# Patient Record
Sex: MALE | Race: WHITE | NOT HISPANIC OR LATINO | Employment: UNEMPLOYED | URBAN - METROPOLITAN AREA
[De-identification: names, ages, dates, MRNs, and addresses within clinical notes are randomized per-mention and may not be internally consistent; named-entity substitution may affect disease eponyms.]

---

## 2024-11-15 ENCOUNTER — APPOINTMENT (OUTPATIENT)
Dept: CT IMAGING | Facility: HOSPITAL | Age: 59
End: 2024-11-15
Payer: MEDICAID

## 2024-11-15 ENCOUNTER — HOSPITAL ENCOUNTER (EMERGENCY)
Facility: HOSPITAL | Age: 59
Discharge: HOME | End: 2024-11-15
Payer: MEDICAID

## 2024-11-15 VITALS
BODY MASS INDEX: 29.73 KG/M2 | SYSTOLIC BLOOD PRESSURE: 132 MMHG | OXYGEN SATURATION: 95 % | HEIGHT: 64 IN | WEIGHT: 174.16 LBS | HEART RATE: 74 BPM | RESPIRATION RATE: 18 BRPM | DIASTOLIC BLOOD PRESSURE: 80 MMHG | TEMPERATURE: 98.3 F

## 2024-11-15 DIAGNOSIS — R11.0 NAUSEA: ICD-10-CM

## 2024-11-15 DIAGNOSIS — R42 DIZZINESS: Primary | ICD-10-CM

## 2024-11-15 LAB
ANION GAP SERPL CALCULATED.3IONS-SCNC: 13.1 MMOL/L (ref 5–15)
BASOPHILS # BLD AUTO: 0.04 10*3/MM3 (ref 0–0.2)
BASOPHILS NFR BLD AUTO: 0.7 % (ref 0–1.5)
BUN SERPL-MCNC: 15 MG/DL (ref 6–20)
BUN/CREAT SERPL: 21.7 (ref 7–25)
CALCIUM SPEC-SCNC: 9.4 MG/DL (ref 8.6–10.5)
CHLORIDE SERPL-SCNC: 102 MMOL/L (ref 98–107)
CO2 SERPL-SCNC: 25.9 MMOL/L (ref 22–29)
CREAT SERPL-MCNC: 0.69 MG/DL (ref 0.76–1.27)
DEPRECATED RDW RBC AUTO: 49.7 FL (ref 37–54)
EGFRCR SERPLBLD CKD-EPI 2021: 107.9 ML/MIN/1.73
EOSINOPHIL # BLD AUTO: 0.5 10*3/MM3 (ref 0–0.4)
EOSINOPHIL NFR BLD AUTO: 9 % (ref 0.3–6.2)
ERYTHROCYTE [DISTWIDTH] IN BLOOD BY AUTOMATED COUNT: 15.1 % (ref 12.3–15.4)
GLUCOSE SERPL-MCNC: 99 MG/DL (ref 65–99)
HCT VFR BLD AUTO: 33.5 % (ref 37.5–51)
HGB BLD-MCNC: 10.4 G/DL (ref 13–17.7)
IMM GRANULOCYTES # BLD AUTO: 0.01 10*3/MM3 (ref 0–0.05)
IMM GRANULOCYTES NFR BLD AUTO: 0.2 % (ref 0–0.5)
LYMPHOCYTES # BLD AUTO: 1.74 10*3/MM3 (ref 0.7–3.1)
LYMPHOCYTES NFR BLD AUTO: 31.5 % (ref 19.6–45.3)
MCH RBC QN AUTO: 27.5 PG (ref 26.6–33)
MCHC RBC AUTO-ENTMCNC: 31 G/DL (ref 31.5–35.7)
MCV RBC AUTO: 88.6 FL (ref 79–97)
MONOCYTES # BLD AUTO: 0.45 10*3/MM3 (ref 0.1–0.9)
MONOCYTES NFR BLD AUTO: 8.1 % (ref 5–12)
NEUTROPHILS NFR BLD AUTO: 2.79 10*3/MM3 (ref 1.7–7)
NEUTROPHILS NFR BLD AUTO: 50.5 % (ref 42.7–76)
NRBC BLD AUTO-RTO: 0 /100 WBC (ref 0–0.2)
PLATELET # BLD AUTO: 496 10*3/MM3 (ref 140–450)
PMV BLD AUTO: 9.1 FL (ref 6–12)
POTASSIUM SERPL-SCNC: 3.9 MMOL/L (ref 3.5–5.2)
RBC # BLD AUTO: 3.78 10*6/MM3 (ref 4.14–5.8)
SODIUM SERPL-SCNC: 141 MMOL/L (ref 136–145)
WBC NRBC COR # BLD AUTO: 5.53 10*3/MM3 (ref 3.4–10.8)

## 2024-11-15 PROCEDURE — 80048 BASIC METABOLIC PNL TOTAL CA: CPT

## 2024-11-15 PROCEDURE — 70450 CT HEAD/BRAIN W/O DYE: CPT

## 2024-11-15 PROCEDURE — 85025 COMPLETE CBC W/AUTO DIFF WBC: CPT

## 2024-11-15 PROCEDURE — 36415 COLL VENOUS BLD VENIPUNCTURE: CPT

## 2024-11-15 PROCEDURE — 99284 EMERGENCY DEPT VISIT MOD MDM: CPT

## 2024-11-15 RX ORDER — GABAPENTIN 300 MG/1
600 CAPSULE ORAL ONCE
Status: COMPLETED | OUTPATIENT
Start: 2024-11-15 | End: 2024-11-15

## 2024-11-15 RX ORDER — OXYCODONE HYDROCHLORIDE 5 MG/1
5 TABLET ORAL ONCE
Status: COMPLETED | OUTPATIENT
Start: 2024-11-15 | End: 2024-11-15

## 2024-11-15 RX ADMIN — GABAPENTIN 600 MG: 300 CAPSULE ORAL at 19:49

## 2024-11-15 RX ADMIN — OXYCODONE 5 MG: 5 TABLET ORAL at 19:48

## 2024-11-16 NOTE — ED PROVIDER NOTES
Subjective   History of Present Illness  Patient is a 58-year-old male presenting to the ED from [facility] for dizziness and nausea the past 3 days.  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  He states he has been having intermittent dizziness while at rest the past few days that comes along with nausea.  Denies any fever, chills, headache, confusion, changes in vision, or vomiting.        Review of Systems   Constitutional:  Negative for chills and fever.   Gastrointestinal:  Positive for nausea. Negative for abdominal pain and vomiting.   Neurological:  Positive for dizziness.       No past medical history on file.    No Known Allergies    No past surgical history on file.    No family history on file.    Social History     Socioeconomic History    Marital status: Single           Objective   Physical Exam  Constitutional:       Appearance: Normal appearance.   HENT:      Head:      Comments: Previous surgical scars noted on the scalp, no new wounds, erythema, edema, or ecchymosis.     Right Ear: Tympanic membrane normal.      Left Ear: Tympanic membrane normal.   Eyes:      Extraocular Movements: Extraocular movements intact.      Pupils: Pupils are equal, round, and reactive to light.   Cardiovascular:      Rate and Rhythm: Normal rate and regular rhythm.      Pulses: Normal pulses.      Heart sounds: Normal heart sounds.   Pulmonary:      Effort: Pulmonary effort is normal.      Breath sounds: Normal breath sounds.   Abdominal:      General: Abdomen is flat. Bowel sounds are normal.      Palpations: Abdomen is soft.      Tenderness: There is no abdominal tenderness.   Musculoskeletal:         General: Normal range of motion.      Cervical back: Normal range of motion.      Right lower leg: No edema.      Left lower leg: No edema.   Skin:     General: Skin is warm and dry.      Capillary Refill: Capillary refill takes less than 2 seconds.   Neurological:      General: No focal  "deficit present.      Mental Status: He is alert and oriented to person, place, and time.   Psychiatric:         Mood and Affect: Mood normal.         Behavior: Behavior normal.         Procedures           ED Course        /80   Pulse 74   Temp 98.3 °F (36.8 °C) (Oral)   Resp 18   Ht 162.6 cm (64\")   Wt 79 kg (174 lb 2.6 oz)   SpO2 95%   BMI 29.90 kg/m²   Labs Reviewed   BASIC METABOLIC PANEL - Abnormal; Notable for the following components:       Result Value    Creatinine 0.69 (*)     All other components within normal limits    Narrative:     GFR Normal >60  Chronic Kidney Disease <60  Kidney Failure <15     CBC WITH AUTO DIFFERENTIAL - Abnormal; Notable for the following components:    RBC 3.78 (*)     Hemoglobin 10.4 (*)     Hematocrit 33.5 (*)     MCHC 31.0 (*)     Platelets 496 (*)     Eosinophil % 9.0 (*)     Eosinophils, Absolute 0.50 (*)     All other components within normal limits   CBC AND DIFFERENTIAL    Narrative:     The following orders were created for panel order CBC & Differential.  Procedure                               Abnormality         Status                     ---------                               -----------         ------                     CBC Auto Differential[[id number]]        Abnormal            Final result                 Please view results for these tests on the individual orders.     Medications   oxyCODONE (ROXICODONE) immediate release tablet 5 mg (5 mg Oral Given 11/15/24 1948)   gabapentin (NEURONTIN) capsule 600 mg (600 mg Oral Given 11/15/24 1949)       CT Head Without Contrast    Result Date: 11/15/2024  Impression: No acute intracranial finding. Chronic posttraumatic and postsurgical changes. Electronically Signed: Gonzalo Barbour  11/15/2024 6:21 PM EST  Workstation ID: [id number]                                                  Medical Decision Making  Patient presented to the ED for the above complaint.    Patient underwent the above exam and " evaluation.    While in the ED patient was placed in a gown.  CT of the head was obtained to assess for ICH.  Blood work was obtained to assess for infection, electrolyte abnormality, dehydration.  Patient requested scheduled gabapentin and oxycodone for chronic pain, this was given.  Upon reevaluation patient is resting comfortably, denying any current dizziness or nausea.  Discussed findings with patient reassured him that his surgical scars looked good and there was no acute findings on CT scan.  Educated patient to continue taking medication as prescribed, increase fluid intake, follow-up with his pediatrician.  Strict return precautions discussed.  Patient voiced understanding, agreeable with dispo plan.      Labs were independently interpreted by myself and deemed remarkable for the following: WBC 5.53, hemoglobin 10.4, glucose 99, CO2 25.9.  CT of the head independently interpreted by radiologist and reviewed by myself showing: No acute findings, chronic posttraumatic and postsurgical changes noted.    Appropriate PPE was worn during each patient encounter.    Discussed this patient with Dr. Posada who agrees with plan.    Problems Addressed:  Dizziness: complicated acute illness or injury  Nausea: complicated acute illness or injury    Amount and/or Complexity of Data Reviewed  Labs: ordered.  Radiology: ordered.    Risk  Prescription drug management.        Final diagnoses:   Dizziness   Nausea       ED Disposition  ED Disposition       ED Disposition   Discharge    Condition   Stable    Comment   --               PATIENT CONNECTION - [place] [id number] [phone]    As needed         Medication List      No changes were made to your prescriptions during this visit.            Anabelle Russell PA-C  11/15/24 2021

## 2024-11-16 NOTE — DISCHARGE INSTRUCTIONS
Increase fluid intake.  Take medications as prescribed.    Follow-up with PCP.    Return to the ED for any new or worsening symptoms.